# Patient Record
Sex: FEMALE | Race: BLACK OR AFRICAN AMERICAN | NOT HISPANIC OR LATINO | URBAN - METROPOLITAN AREA
[De-identification: names, ages, dates, MRNs, and addresses within clinical notes are randomized per-mention and may not be internally consistent; named-entity substitution may affect disease eponyms.]

---
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
## 2023-02-07 NOTE — ED ADULT NURSE NOTE - NSSUHOSCREENINGYN_ED_ALL_ED
[place] (saw a physician/midlevel provider but left without telling anyone) No - the patient is unable to be screened due to medical condition

## 2023-10-14 ENCOUNTER — OFFICE VISIT (OUTPATIENT)
Dept: URGENT CARE | Facility: CLINIC | Age: 18
End: 2023-10-14
Payer: COMMERCIAL

## 2023-10-14 VITALS
TEMPERATURE: 98.4 F | HEART RATE: 99 BPM | RESPIRATION RATE: 18 BRPM | BODY MASS INDEX: 24.99 KG/M2 | SYSTOLIC BLOOD PRESSURE: 130 MMHG | OXYGEN SATURATION: 99 % | HEIGHT: 65 IN | WEIGHT: 150 LBS | DIASTOLIC BLOOD PRESSURE: 82 MMHG

## 2023-10-14 DIAGNOSIS — N30.01 ACUTE CYSTITIS WITH HEMATURIA: Primary | ICD-10-CM

## 2023-10-14 LAB
SL AMB  POCT GLUCOSE, UA: NEGATIVE
SL AMB LEUKOCYTE ESTERASE,UA: ABNORMAL
SL AMB POCT BILIRUBIN,UA: ABNORMAL
SL AMB POCT BLOOD,UA: ABNORMAL
SL AMB POCT CLARITY,UA: CLEAR
SL AMB POCT COLOR,UA: NEGATIVE
SL AMB POCT KETONES,UA: NEGATIVE
SL AMB POCT NITRITE,UA: NEGATIVE
SL AMB POCT PH,UA: 6.5
SL AMB POCT SPECIFIC GRAVITY,UA: 1.01
SL AMB POCT URINE HCG: NEGATIVE
SL AMB POCT URINE PROTEIN: 30
SL AMB POCT UROBILINOGEN: 0.2

## 2023-10-14 PROCEDURE — 87086 URINE CULTURE/COLONY COUNT: CPT | Performed by: PHYSICIAN ASSISTANT

## 2023-10-14 PROCEDURE — 87077 CULTURE AEROBIC IDENTIFY: CPT | Performed by: PHYSICIAN ASSISTANT

## 2023-10-14 PROCEDURE — 87186 SC STD MICRODIL/AGAR DIL: CPT | Performed by: PHYSICIAN ASSISTANT

## 2023-10-14 PROCEDURE — 99213 OFFICE O/P EST LOW 20 MIN: CPT | Performed by: PHYSICIAN ASSISTANT

## 2023-10-14 PROCEDURE — 81002 URINALYSIS NONAUTO W/O SCOPE: CPT | Performed by: PHYSICIAN ASSISTANT

## 2023-10-14 PROCEDURE — 81025 URINE PREGNANCY TEST: CPT | Performed by: PHYSICIAN ASSISTANT

## 2023-10-14 RX ORDER — PHENAZOPYRIDINE HYDROCHLORIDE 200 MG/1
200 TABLET, FILM COATED ORAL
Qty: 10 TABLET | Refills: 0 | Status: SHIPPED | OUTPATIENT
Start: 2023-10-14

## 2023-10-14 RX ORDER — NITROFURANTOIN 25; 75 MG/1; MG/1
100 CAPSULE ORAL 2 TIMES DAILY
Qty: 10 CAPSULE | Refills: 0 | Status: SHIPPED | OUTPATIENT
Start: 2023-10-14 | End: 2023-10-19

## 2023-10-14 NOTE — PROGRESS NOTES
[facility]        NAME: Horace Knapp is a 19 y.o. female  : 2005    MRN: [id number]  DATE: 2023  TIME: 3:35 PM    Assessment and Plan   Acute cystitis with hematuria [N30.01]  1. Acute cystitis with hematuria  POCT urine dip    POCT urine HCG    nitrofurantoin (MACROBID) 100 mg capsule    phenazopyridine (PYRIDIUM) 200 mg tablet    Urine culture            Patient Instructions   Take antibiotic as prescribed. Complete full dose of antibiotics even if symptoms begin to improve or resolve. May use OTC Tylenol for fever. DRINK LOTS OF FLUIDS. Observe for signs of worsening infection including increased pain, discharge, blood in the urine, back or flank pain, fever or chills, or persistent symptoms. Your symptoms should begin to improve over the next couple days. Follow-up with your PCP in 3-5 days if symptoms worsen or do not improve. Go to ER if symptoms become severe. Follow up with PCP in 3-5 days. Proceed to  ER if symptoms worsen. Chief Complaint     Chief Complaint   Patient presents with    Possible UTI     Bladder pain started today. History of Present Illness       Patient is a 69-year-old female with no significant past medical history presents the office complaining of dysuria since today. Urinary Tract Infection   This is a new problem. The current episode started today. The problem has been gradually improving. There has been no fever. Sexually active: denies concerns for STDs. Associated symptoms include frequency, hesitancy and urgency. Pertinent negatives include no chills, discharge, flank pain, hematuria, nausea or vomiting. Review of Systems   Review of Systems   Constitutional:  Negative for chills and fever. Gastrointestinal:  Negative for abdominal pain, diarrhea, nausea and vomiting. Genitourinary:  Positive for dysuria, frequency, hesitancy and urgency.  Negative for decreased urine volume, difficulty urinating, enuresis, flank pain, hematuria, pelvic pain, vaginal bleeding, vaginal discharge and vaginal pain. Skin:  Negative for rash. Current Medications       Current Outpatient Medications:     nitrofurantoin (MACROBID) 100 mg capsule, Take 1 capsule (100 mg total) by mouth 2 (two) times a day for 5 days, Disp: 10 capsule, Rfl: 0    phenazopyridine (PYRIDIUM) 200 mg tablet, Take 1 tablet (200 mg total) by mouth 3 (three) times a day with meals, Disp: 10 tablet, Rfl: 0    Current Allergies     Allergies as of 10/14/2023    (No Known Allergies)            The following portions of the patient's history were reviewed and updated as appropriate: allergies, current medications, past family history, past medical history, past social history, past surgical history and problem list.     History reviewed. No pertinent past medical history. History reviewed. No pertinent surgical history. Family History   Problem Relation Age of Onset    Hypertension Mother     No Known Problems Father          Medications have been verified. Objective   /82   Pulse 99   Temp 98.4 °F (36.9 °C)   Resp 18   Ht 5' 5" (1.651 m)   Wt 68 kg (150 lb)   LMP 09/30/2023   SpO2 99%   BMI 24.96 kg/m²   Patient's last menstrual period was 09/30/2023. Physical Exam     Physical Exam  Vitals and nursing note reviewed. Constitutional:       Appearance: Normal appearance. She is well-developed. HENT:      Head: Normocephalic and atraumatic. Right Ear: External ear normal.      Left Ear: External ear normal.      Nose: Nose normal.   Eyes:      General: Lids are normal.   Cardiovascular:      Rate and Rhythm: Normal rate and regular rhythm. Pulses: Normal pulses. Heart sounds: Normal heart sounds. No murmur heard. No friction rub. No gallop. Pulmonary:      Effort: Pulmonary effort is normal.      Breath sounds: Normal breath sounds. No wheezing, rhonchi or rales. Chest:      Chest wall: No tenderness. Abdominal:      General: Bowel sounds are normal.      Palpations: Abdomen is soft. Tenderness: There is no abdominal tenderness. There is no right CVA tenderness or left CVA tenderness. Musculoskeletal:         General: Normal range of motion. Lymphadenopathy:      Cervical: No cervical adenopathy. Skin:     General: Skin is warm and dry. Capillary Refill: Capillary refill takes less than 2 seconds. Findings: No rash. Neurological:      Mental Status: She is alert.          Urine preg: negative    Urine dip:       LEUKOCYTE ESTERASE,UA large   NITRITE,UA negative   SL AMB POCT UROBILINOGEN 0.2   POCT URINE PROTEIN 30    PH,UA 6.5   BLOOD,UA large   SPECIFIC GRAVITY,UA 1.010   KETONES,UA negative   BILIRUBIN,UA pink   GLUCOSE, UA negative    COLOR,UA negative   CLARITY,UA clear

## 2023-10-17 LAB — BACTERIA UR CULT: ABNORMAL

## 2025-02-24 ENCOUNTER — OFFICE VISIT (OUTPATIENT)
Dept: URGENT CARE | Facility: CLINIC | Age: 20
End: 2025-02-24
Payer: COMMERCIAL

## 2025-02-24 VITALS
HEIGHT: 65 IN | SYSTOLIC BLOOD PRESSURE: 122 MMHG | HEART RATE: 90 BPM | RESPIRATION RATE: 18 BRPM | DIASTOLIC BLOOD PRESSURE: 81 MMHG | WEIGHT: 164.8 LBS | TEMPERATURE: 97.6 F | OXYGEN SATURATION: 100 % | BODY MASS INDEX: 27.46 KG/M2

## 2025-02-24 DIAGNOSIS — J02.9 ACUTE PHARYNGITIS, UNSPECIFIED ETIOLOGY: Primary | ICD-10-CM

## 2025-02-24 DIAGNOSIS — R68.89 FLU-LIKE SYMPTOMS: ICD-10-CM

## 2025-02-24 LAB — S PYO AG THROAT QL: NEGATIVE

## 2025-02-24 PROCEDURE — 87880 STREP A ASSAY W/OPTIC: CPT

## 2025-02-24 PROCEDURE — 99213 OFFICE O/P EST LOW 20 MIN: CPT

## 2025-02-24 PROCEDURE — 87070 CULTURE OTHR SPECIMN AEROBIC: CPT

## 2025-02-24 RX ORDER — AMOXICILLIN 500 MG/1
500 TABLET, FILM COATED ORAL 2 TIMES DAILY
Qty: 14 TABLET | Refills: 0 | Status: SHIPPED | OUTPATIENT
Start: 2025-02-24 | End: 2025-03-03

## 2025-02-24 NOTE — PATIENT INSTRUCTIONS
Thank you for trusting your health with [facility].    Please review the following instructions and return to our clinic or consider an Emergency Department visit for worsening or severe symptoms.    Instructions:   Utilize saline nasal spray OTC STRAIGHT down each nostril as often as needed  Utilize Flonase nasal spray with steroids OTC ANGLED towards your sinuses 2 times a day as needed  Maintain Tylenol every 6 hours as needed, do not exceed six, 500 mg doses in a 24 hour time period for fever, aches, and chills  Mucinex, blue box, consistently as written on the box can help break up mucus well  Hydrate, hydrate, hydrate  If symptoms worsen or do not start resolving with the week, please contact PCP or consider another visit with our team

## 2025-02-24 NOTE — PROGRESS NOTES
"Name: Prachi Zhou      : 2005      MRN: [id number]  Encounter Provider: Oralia Weinberg PA-C  Encounter Date: 2025   Encounter department: [facility]  :  Assessment & Plan  Flu-like symptoms       Pt started with fever, body aches, congestion three days ago but having worsened sore throat and aches now.    Reviewed otc management as below and rapid strep negative but exam concerning for strep. Will send abx to start.      Acute pharyngitis, unspecified etiology    Orders:    amoxicillin (AMOXIL) 500 MG tablet; Take 1 tablet (500 mg total) by mouth 2 (two) times a day for 7 days    POCT rapid ANTIGEN strepA    Throat culture; Future          History of Present Illness   HPI  Prachi Zhou is a 19 y.o. female who presents with body aches and sore throat since Saturday           Review of Systems   HENT:  Positive for congestion, sinus pressure and sore throat.    Musculoskeletal:  Positive for arthralgias and myalgias.          Objective   /81   Pulse 90   Temp 97.6 °F (36.4 °C) (Tympanic)   Resp 18   Ht 5' 5\" (1.651 m)   Wt 74.8 kg (164 lb 12.8 oz)   SpO2 100%   BMI 27.42 kg/m²      Physical Exam  Constitutional:       Appearance: She is ill-appearing.   HENT:      Head: Normocephalic and atraumatic.      Right Ear: Ear canal normal.      Left Ear: Tympanic membrane and ear canal normal.      Ears:      Comments: R TM perforation and mild residual effusion.     Mouth/Throat:      Mouth: Mucous membranes are moist.      Pharynx: Posterior oropharyngeal erythema present.   Cardiovascular:      Rate and Rhythm: Normal rate.   Pulmonary:      Effort: Pulmonary effort is normal.   Neurological:      Mental Status: She is alert.           "

## 2025-02-24 NOTE — LETTER
February 24, 2025     Patient: Prachi Zhou   YOB: 2005   Date of Visit: 2/24/2025       To Whom it May Concern:    Prachi Zhou was seen in my clinic on 2/24/2025. She may return to work on 2/27/25 .    If you have any questions or concerns, please don't hesitate to call.         Sincerely,          Oralia Weinberg PA-C        CC: No Recipients

## 2025-02-26 LAB — BACTERIA THROAT CULT: NORMAL
